# Patient Record
Sex: FEMALE | NOT HISPANIC OR LATINO | URBAN - NONMETROPOLITAN AREA
[De-identification: names, ages, dates, MRNs, and addresses within clinical notes are randomized per-mention and may not be internally consistent; named-entity substitution may affect disease eponyms.]

---

## 2017-01-17 ENCOUNTER — APPOINTMENT (RX ONLY)
Dept: URBAN - NONMETROPOLITAN AREA CLINIC 22 | Facility: CLINIC | Age: 62
Setting detail: DERMATOLOGY
End: 2017-01-17

## 2017-01-17 DIAGNOSIS — Z41.9 ENCOUNTER FOR PROCEDURE FOR PURPOSES OTHER THAN REMEDYING HEALTH STATE, UNSPECIFIED: ICD-10-CM

## 2017-01-17 PROBLEM — L40.0 PSORIASIS VULGARIS: Status: ACTIVE | Noted: 2017-01-17

## 2017-01-17 PROBLEM — E78.5 HYPERLIPIDEMIA, UNSPECIFIED: Status: ACTIVE | Noted: 2017-01-17

## 2017-01-17 PROCEDURE — ? OTHER (COSMETIC)

## 2017-01-17 ASSESSMENT — LOCATION SIMPLE DESCRIPTION DERM: LOCATION SIMPLE: LEFT FOREHEAD

## 2017-01-17 ASSESSMENT — LOCATION DETAILED DESCRIPTION DERM: LOCATION DETAILED: LEFT MEDIAL FOREHEAD

## 2017-01-17 ASSESSMENT — LOCATION ZONE DERM: LOCATION ZONE: FACE

## 2017-01-17 NOTE — PROCEDURE: OTHER (COSMETIC)
Other (Free Text): She was scheduled for her 4th microneedling treatment today. On her way up here, she received a phone call about her Mother-in-law and [place]. I expressed to her the importance of being with her  and family. We will reschedule when it is convenient for her.
Detail Level: Zone

## 2017-02-16 ENCOUNTER — APPOINTMENT (RX ONLY)
Dept: URBAN - NONMETROPOLITAN AREA CLINIC 22 | Facility: CLINIC | Age: 62
Setting detail: DERMATOLOGY
End: 2017-02-16

## 2017-02-16 ENCOUNTER — RX ONLY (OUTPATIENT)
Age: 62
Setting detail: RX ONLY
End: 2017-02-16

## 2017-02-16 DIAGNOSIS — Z41.9 ENCOUNTER FOR PROCEDURE FOR PURPOSES OTHER THAN REMEDYING HEALTH STATE, UNSPECIFIED: ICD-10-CM

## 2017-02-16 PROBLEM — L55.1 SUNBURN OF SECOND DEGREE: Status: ACTIVE | Noted: 2017-02-16

## 2017-02-16 PROBLEM — L29.8 OTHER PRURITUS: Status: ACTIVE | Noted: 2017-02-16

## 2017-02-16 PROBLEM — L85.3 XEROSIS CUTIS: Status: ACTIVE | Noted: 2017-02-16

## 2017-02-16 PROCEDURE — ? ECLIPSE MICROPEN

## 2017-02-16 PROCEDURE — ? OTHER (COSMETIC)

## 2017-02-16 RX ORDER — TRIAMCINOLONE ACETONIDE 0.1 %
OINTMENT (GRAM) TOPICAL
Qty: 1 | Refills: 0 | Status: ERX | COMMUNITY
Start: 2017-02-16

## 2017-02-16 ASSESSMENT — LOCATION DETAILED DESCRIPTION DERM
LOCATION DETAILED: RIGHT SUPERIOR CENTRAL MALAR CHEEK
LOCATION DETAILED: RIGHT CHIN
LOCATION DETAILED: INFERIOR MID FOREHEAD
LOCATION DETAILED: LEFT SUPERIOR CENTRAL MALAR CHEEK
LOCATION DETAILED: RIGHT MEDIAL MALAR CHEEK
LOCATION DETAILED: PHILTRUM
LOCATION DETAILED: LEFT INFERIOR NASAL CHEEK
LOCATION DETAILED: RIGHT MEDIAL FOREHEAD
LOCATION DETAILED: RIGHT SUPERIOR MEDIAL MALAR CHEEK
LOCATION DETAILED: LEFT INFERIOR CENTRAL MALAR CHEEK
LOCATION DETAILED: RIGHT INFERIOR CENTRAL MALAR CHEEK
LOCATION DETAILED: LEFT LATERAL CANTHUS

## 2017-02-16 ASSESSMENT — LOCATION SIMPLE DESCRIPTION DERM
LOCATION SIMPLE: LEFT CHEEK
LOCATION SIMPLE: UPPER LIP
LOCATION SIMPLE: RIGHT FOREHEAD
LOCATION SIMPLE: LEFT EYELID
LOCATION SIMPLE: INFERIOR FOREHEAD
LOCATION SIMPLE: RIGHT CHEEK
LOCATION SIMPLE: CHIN

## 2017-02-16 ASSESSMENT — LOCATION ZONE DERM
LOCATION ZONE: LIP
LOCATION ZONE: FACE
LOCATION ZONE: EYELID

## 2017-02-16 NOTE — PROCEDURE: OTHER (COSMETIC)
Other (Free Text): Today was treatment #4 of package. I will evaluate patient in a month. She knows she may need more treatments and she understands that it's going to take a few months for the true results to come out.
Detail Level: Zone

## 2017-02-16 NOTE — PROCEDURE: ECLIPSE MICROPEN
Prp Used In Ccs: 0
Depth In Mm: 1.7
Location #1: Cheeks
Depth In Mm: 0.6
Treatment Number (Optional): 4
Location #6: Lower Orbital Rim
Location #3: Upper Lip
Post-Care Instructions: After the procedure, take precautions against sun exposure. Do not apply sunscreen for 12 hours after the procedure. Do not apply make-up for 12 hours after the procedure. Avoid alcohol based toners for 10-14 days. After 2-3 days patients can return to their regular skin regimen.\\n\\nShe was sent home with gauze to be used as cool compresses if needed. SkinCeuticals CE Ferulic was applied post tx and sent home for daily use in the AM.
Depth In Mm: 0.7
Depth In Mm: 1.5
Location #2: Chin
Depth In Mm: 0.5
Location #4: Nose
Consent: Risks reviewed including but not limited to pain, scarring, infection and incomplete improvement.  Patient understands the procedure is cosmetic in nature and will require out of pocket payment.
Location #5: Crows Feet
Location #7: Forehead
Pre-Procedure Text: The treatment areas were cleansed with simply clean cleanser and topical numbing agent was then applied, left on for approximately 10 minutes. Numb was removed with simply clean and rough gauze. A second cleanse with simply clean cleanser and a warm towel, skin was prepped with alcohol. Treatment proceeded as outlined above.
Detail Level: Zone

## 2017-08-15 ENCOUNTER — APPOINTMENT (RX ONLY)
Dept: URBAN - NONMETROPOLITAN AREA CLINIC 22 | Facility: CLINIC | Age: 62
Setting detail: DERMATOLOGY
End: 2017-08-15

## 2017-08-15 VITALS — WEIGHT: 160 LBS | HEIGHT: 66.5 IN

## 2017-08-15 DIAGNOSIS — L57.8 OTHER SKIN CHANGES DUE TO CHRONIC EXPOSURE TO NONIONIZING RADIATION: ICD-10-CM

## 2017-08-15 DIAGNOSIS — M71 OTHER BURSOPATHIES: ICD-10-CM

## 2017-08-15 DIAGNOSIS — Z85.820 PERSONAL HISTORY OF MALIGNANT MELANOMA OF SKIN: ICD-10-CM

## 2017-08-15 DIAGNOSIS — L20.89 OTHER ATOPIC DERMATITIS: ICD-10-CM

## 2017-08-15 DIAGNOSIS — L71.8 OTHER ROSACEA: ICD-10-CM

## 2017-08-15 DIAGNOSIS — B07.8 OTHER VIRAL WARTS: ICD-10-CM

## 2017-08-15 DIAGNOSIS — D22 MELANOCYTIC NEVI: ICD-10-CM

## 2017-08-15 PROBLEM — E78.5 HYPERLIPIDEMIA, UNSPECIFIED: Status: ACTIVE | Noted: 2017-08-15

## 2017-08-15 PROBLEM — M71.372 OTHER BURSAL CYST, LEFT ANKLE AND FOOT: Status: ACTIVE | Noted: 2017-08-15

## 2017-08-15 PROBLEM — M12.9 ARTHROPATHY, UNSPECIFIED: Status: ACTIVE | Noted: 2017-08-15

## 2017-08-15 PROBLEM — L20.84 INTRINSIC (ALLERGIC) ECZEMA: Status: ACTIVE | Noted: 2017-08-15

## 2017-08-15 PROBLEM — L85.3 XEROSIS CUTIS: Status: ACTIVE | Noted: 2017-08-15

## 2017-08-15 PROBLEM — L40.0 PSORIASIS VULGARIS: Status: ACTIVE | Noted: 2017-08-15

## 2017-08-15 PROBLEM — D22.5 MELANOCYTIC NEVI OF TRUNK: Status: ACTIVE | Noted: 2017-08-15

## 2017-08-15 PROBLEM — L29.8 OTHER PRURITUS: Status: ACTIVE | Noted: 2017-08-15

## 2017-08-15 PROCEDURE — 99214 OFFICE O/P EST MOD 30 MIN: CPT

## 2017-08-15 PROCEDURE — ? PATIENT SPECIFIC COUNSELING

## 2017-08-15 PROCEDURE — ? COUNSELING

## 2017-08-15 PROCEDURE — ? TREATMENT REGIMEN

## 2017-08-15 ASSESSMENT — LOCATION DETAILED DESCRIPTION DERM
LOCATION DETAILED: RIGHT ANTERIOR PROXIMAL THIGH
LOCATION DETAILED: LEFT DORSAL 3RD TOE
LOCATION DETAILED: RIGHT INFERIOR MEDIAL UPPER BACK
LOCATION DETAILED: LEFT ANTECUBITAL SKIN
LOCATION DETAILED: LEFT INFERIOR CENTRAL MALAR CHEEK
LOCATION DETAILED: RIGHT INFERIOR MEDIAL MIDBACK
LOCATION DETAILED: RIGHT PROXIMAL PRETIBIAL REGION
LOCATION DETAILED: LEFT LATERAL PROXIMAL PRETIBIAL REGION
LOCATION DETAILED: LEFT ANTERIOR DISTAL THIGH

## 2017-08-15 ASSESSMENT — LOCATION ZONE DERM
LOCATION ZONE: TRUNK
LOCATION ZONE: TOE
LOCATION ZONE: LEG
LOCATION ZONE: FACE
LOCATION ZONE: ARM

## 2017-08-15 ASSESSMENT — LOCATION SIMPLE DESCRIPTION DERM
LOCATION SIMPLE: LEFT 3RD TOE
LOCATION SIMPLE: LEFT CHEEK
LOCATION SIMPLE: RIGHT LOWER BACK
LOCATION SIMPLE: RIGHT UPPER BACK
LOCATION SIMPLE: LEFT PRETIBIAL REGION
LOCATION SIMPLE: RIGHT THIGH
LOCATION SIMPLE: RIGHT PRETIBIAL REGION
LOCATION SIMPLE: LEFT THIGH
LOCATION SIMPLE: LEFT UPPER ARM

## 2017-08-15 NOTE — PROCEDURE: COUNSELING
Detail Level: Generalized
Detail Level: Detailed
Quality 224: Stage 0-Iic Melanoma: Overutilization Of Imaging Studies For Only Stage 0-Iic Melanoma: None of the following diagnostic imaging studies ordered: chest X-ray, CT, Ultrasound, MRI, PET, or nuclear medicine scans (ML)
Quality 137: Melanoma: Continuity Of Care - Recall System: Patient information entered into a recall system that includes: target date for the next exam specified AND a process to follow up with patients regarding missed or unscheduled appointments
Detail Level: Zone

## 2017-08-15 NOTE — HPI: OTHER
Condition:: F/u history of Rosacea located on her face
Please Describe Your Condition:: She currently applied Finacea QD, and Metrogel when She has outbreaks. She takes a Doxy 50mg daily. \\n\\nPatient states she is currently on Doxy 100mg BID for 4 days now, and will continue for a full 7 days for a severe sinus infection.

## 2017-08-15 NOTE — PROCEDURE: TREATMENT REGIMEN
Continue Regimen: Avene retinol QHS, AOX skin ceuticals serum
Detail Level: Simple
Detail Level: Zone
Continue Regimen: Finacea daily, Doxycycline 50mg daily, and Metrogel PRN for pimply outbreaks
Initiate Treatment: Can apply TMC ointment BID for up 2 weeks at a time, then PRN for flaring
Plan: She has had some def improvement in acne scarring from FX and microneedling, but not sure it is enough that she wants to consider more treatment .

## 2017-08-15 NOTE — PROCEDURE: PATIENT SPECIFIC COUNSELING
She has mult very small erythematous papules that do not appear to be follicularly based.   I am not sure if these are verucca or molluscum or an inflammatory process.  There is no pustule on ELM.   As they seem to be getting some better will follow for now.  Call if do not continue to improve.
Detail Level: Detailed

## 2017-08-15 NOTE — HPI: MELANOMA F/U (HISTORY OF MALIGNANT MELANOMA)
What Is The Reason For Today's Visit?: History of Melanoma
Year Excised?: 2007
Breslow Depth?: 0.34
Additional History: She is very compliant with her sun protection. She presents for a FBSE, melanoma check, and skin cancer surveillance

## 2017-11-07 ENCOUNTER — RX ONLY (OUTPATIENT)
Age: 62
Setting detail: RX ONLY
End: 2017-11-07

## 2017-11-07 RX ORDER — METRONIDAZOLE 10 MG/G
GEL TOPICAL DAILY
Qty: 1 | Refills: 3 | Status: ERX

## 2017-11-07 RX ORDER — AZELAIC ACID 0.15 G/G
AEROSOL, FOAM TOPICAL
Qty: 50 | Refills: 6 | Status: ERX

## 2018-03-19 ENCOUNTER — APPOINTMENT (RX ONLY)
Dept: URBAN - NONMETROPOLITAN AREA CLINIC 22 | Facility: CLINIC | Age: 63
Setting detail: DERMATOLOGY
End: 2018-03-19

## 2018-03-19 PROCEDURE — ? PRODUCT LINE (SUNSCREENS)

## 2018-03-19 PROCEDURE — ? PRODUCT LINE (ANTI-AGING)

## 2018-03-19 NOTE — PROCEDURE: PRODUCT LINE (ANTI-AGING)
Product 76 Units: 0
Product 74 Price (In Dollars - Numeric Only, No Special Characters Or $): 0.00
Detail Level: Zone
Name Of Product 1: Avene retrinal 0.1%
Product 1 Units: 1
Send Charges To Patient Encounter: Yes
Name Of Product 2: Avene Retinaldehyde 0.1% Cream
Product 2 Application Directions: apply qhs
Product 1 Application Directions: apply  AM
Product 1 Price (In Dollars - Numeric Only, No Special Characters Or $): 73.14

## 2018-03-19 NOTE — PROCEDURE: PRODUCT LINE (SUNSCREENS)
Product 61 Units: 0
Product 28 Price (In Dollars - Numeric Only, No Special Characters Or $): 0.00
Product 1 Units: 1
Product 1 Price (In Dollars - Numeric Only, No Special Characters Or $): 36.04
Detail Level: Zone
Send Charges To Patient Encounter: Yes
Name Of Product 1: Skin ceuticels physical fusion

## 2018-08-22 ENCOUNTER — APPOINTMENT (RX ONLY)
Dept: URBAN - NONMETROPOLITAN AREA CLINIC 22 | Facility: CLINIC | Age: 63
Setting detail: DERMATOLOGY
End: 2018-08-22

## 2018-08-22 VITALS — HEIGHT: 66 IN | WEIGHT: 170 LBS

## 2018-08-22 DIAGNOSIS — Z87.2 PERSONAL HISTORY OF DISEASES OF THE SKIN AND SUBCUTANEOUS TISSUE: ICD-10-CM

## 2018-08-22 DIAGNOSIS — D22 MELANOCYTIC NEVI: ICD-10-CM

## 2018-08-22 DIAGNOSIS — L71.8 OTHER ROSACEA: ICD-10-CM | Status: STABLE

## 2018-08-22 DIAGNOSIS — L57.0 ACTINIC KERATOSIS: ICD-10-CM

## 2018-08-22 DIAGNOSIS — K13.0 DISEASES OF LIPS: ICD-10-CM

## 2018-08-22 DIAGNOSIS — L57.8 OTHER SKIN CHANGES DUE TO CHRONIC EXPOSURE TO NONIONIZING RADIATION: ICD-10-CM

## 2018-08-22 DIAGNOSIS — L73.8 OTHER SPECIFIED FOLLICULAR DISORDERS: ICD-10-CM

## 2018-08-22 DIAGNOSIS — M71 OTHER BURSOPATHIES: ICD-10-CM

## 2018-08-22 DIAGNOSIS — Z85.820 PERSONAL HISTORY OF MALIGNANT MELANOMA OF SKIN: ICD-10-CM

## 2018-08-22 PROBLEM — M12.9 ARTHROPATHY, UNSPECIFIED: Status: ACTIVE | Noted: 2018-08-22

## 2018-08-22 PROBLEM — L70.0 ACNE VULGARIS: Status: ACTIVE | Noted: 2018-08-22

## 2018-08-22 PROBLEM — L20.84 INTRINSIC (ALLERGIC) ECZEMA: Status: ACTIVE | Noted: 2018-08-22

## 2018-08-22 PROBLEM — L40.0 PSORIASIS VULGARIS: Status: ACTIVE | Noted: 2018-08-22

## 2018-08-22 PROBLEM — L85.3 XEROSIS CUTIS: Status: ACTIVE | Noted: 2018-08-22

## 2018-08-22 PROBLEM — J30.1 ALLERGIC RHINITIS DUE TO POLLEN: Status: ACTIVE | Noted: 2018-08-22

## 2018-08-22 PROBLEM — E78.5 HYPERLIPIDEMIA, UNSPECIFIED: Status: ACTIVE | Noted: 2018-08-22

## 2018-08-22 PROBLEM — L29.8 OTHER PRURITUS: Status: ACTIVE | Noted: 2018-08-22

## 2018-08-22 PROBLEM — M71.372 OTHER BURSAL CYST, LEFT ANKLE AND FOOT: Status: ACTIVE | Noted: 2018-08-22

## 2018-08-22 PROBLEM — L55.1 SUNBURN OF SECOND DEGREE: Status: ACTIVE | Noted: 2018-08-22

## 2018-08-22 PROBLEM — D22.5 MELANOCYTIC NEVI OF TRUNK: Status: ACTIVE | Noted: 2018-08-22

## 2018-08-22 PROCEDURE — ? PATIENT SPECIFIC COUNSELING

## 2018-08-22 PROCEDURE — ? PRESCRIPTION

## 2018-08-22 PROCEDURE — ? TREATMENT REGIMEN

## 2018-08-22 PROCEDURE — ? COUNSELING

## 2018-08-22 PROCEDURE — 99214 OFFICE O/P EST MOD 30 MIN: CPT

## 2018-08-22 RX ORDER — FLUOROURACIL 50 MG/G
CREAM TOPICAL
Qty: 1 | Refills: 1 | Status: ERX | COMMUNITY
Start: 2018-08-22

## 2018-08-22 RX ORDER — HYDROCORTISONE 2.5 %
OINTMENT (GRAM) TOPICAL
Qty: 1 | Refills: 1 | Status: ERX | COMMUNITY
Start: 2018-08-22

## 2018-08-22 RX ADMIN — FLUOROURACIL: 50 CREAM TOPICAL at 00:00

## 2018-08-22 RX ADMIN — Medication: at 00:00

## 2018-08-22 ASSESSMENT — LOCATION DETAILED DESCRIPTION DERM
LOCATION DETAILED: LEFT CENTRAL MALAR CHEEK
LOCATION DETAILED: LEFT DORSAL 3RD TOE
LOCATION DETAILED: RIGHT DISTAL POSTERIOR THIGH
LOCATION DETAILED: LEFT SUPERIOR CENTRAL BUCCAL CHEEK
LOCATION DETAILED: RIGHT MEDIAL MALAR CHEEK
LOCATION DETAILED: RIGHT INFERIOR MEDIAL MIDBACK
LOCATION DETAILED: RIGHT SUPERIOR VERMILION LIP
LOCATION DETAILED: LEFT INFERIOR VERMILION LIP

## 2018-08-22 ASSESSMENT — LOCATION SIMPLE DESCRIPTION DERM
LOCATION SIMPLE: LEFT 3RD TOE
LOCATION SIMPLE: RIGHT CHEEK
LOCATION SIMPLE: RIGHT LIP
LOCATION SIMPLE: LEFT LIP
LOCATION SIMPLE: LEFT CHEEK
LOCATION SIMPLE: RIGHT POSTERIOR THIGH
LOCATION SIMPLE: RIGHT LOWER BACK

## 2018-08-22 ASSESSMENT — LOCATION ZONE DERM
LOCATION ZONE: TRUNK
LOCATION ZONE: FACE
LOCATION ZONE: LEG
LOCATION ZONE: LIP
LOCATION ZONE: TOE

## 2018-08-22 NOTE — PROCEDURE: TREATMENT REGIMEN
Continue Regimen: Doxycycline 50 mg q.d., Finacea and Metronidazole
Detail Level: Simple
Plan: Not bothersome to pt
Plan: Pt educated on treatment option and will use efudex cream b.i.d x 2 weeks. Pt given handout and verbal instructions.

## 2018-08-22 NOTE — HPI: PHOTOAGING (ACTINIC DAMAGE)
Is This A New Presentation, Or A Follow-Up?: Sun Damage
Additional History: Pt is here for CSE and Skin cancer surveillance

## 2018-08-22 NOTE — PROCEDURE: PATIENT SPECIFIC COUNSELING
Detail Level: Simple
Will use HC 2.5% ointment no longer that 2 week then will use Vaseline, will d/c all other products to lips.  I suspect this is a contact to one of the agents she has been using.

## 2018-12-21 ENCOUNTER — APPOINTMENT (RX ONLY)
Dept: URBAN - NONMETROPOLITAN AREA CLINIC 22 | Facility: CLINIC | Age: 63
Setting detail: DERMATOLOGY
End: 2018-12-21

## 2018-12-21 PROCEDURE — ? PRODUCT LINE (ANTI-AGING)

## 2018-12-21 NOTE — PROCEDURE: PRODUCT LINE (ANTI-AGING)
Product 61 Units: 0
Product 74 Price (In Dollars - Numeric Only, No Special Characters Or $): 0.00
Product 10 Price (In Dollars - Numeric Only, No Special Characters Or $): 189.90
Name Of Product 71: Skin[employer]ica Illuminating Eye Serum
Product 14 Price (In Dollars - Numeric Only, No Special Characters Or $): 32.55
Name Of Product 38: R|Essentials Brightening Pads
Product 64 Price (In Dollars - Numeric Only, No Special Characters Or $): 145.16
Product 4 Price (In Dollars - Numeric Only, No Special Characters Or $): 40.64
Name Of Product 6: Skin[employer]ica TNS Recovery Complex
Name Of Product 52: Avene Retrinal 0.1%
Product 63 Price (In Dollars - Numeric Only, No Special Characters Or $): 86.22
Name Of Product 25: R|Essentials Retinol Serum 10x
Name Of Product 10: Phloretin Gel
Product 17 Price (In Dollars - Numeric Only, No Special Characters Or $): 71.30
Product 15 Price (In Dollars - Numeric Only, No Special Characters Or $): 97.28
Name Of Product 12: Repair Eye Serum
Name Of Product 1: R|E Advanced Eye Cream
Product 31 Application Directions: Pt recieved 15% off for December Special. Pt will use Q HS to arms and Legs and Use Cetaphil Restoraderm Cream or CeraVe cream to arms and legs q am.
Product 25 Application Directions: Product is 10% off this month
Name Of Product 30: SkinCeuticals Metacell Renewal B3
Name Of Product 11: Triple Lipid Restore
Name Of Product 9: R|Essentials Triple Antioxidant Serum
Product 52 Units: 1
Name Of Product 19: AHA|BHA Exfoliating Cleanser
Product 29 Application Directions: Pt Received 15% off for December Special
Product 9 Price (In Dollars - Numeric Only, No Special Characters Or $): 88.20
Name Of Product 8: Skinceuticals Retinol 0.05%
Product 33 Application Directions: In Office Dispensing Rx
Name Of Product 62: SkinCeuticals Retexturizing Activator
Product 35 Price (In Dollars - Numeric Only, No Special Characters Or $): 81.43
Product 34 Price (In Dollars - Numeric Only, No Special Characters Or $): 80.59
Product 29 Price (In Dollars - Numeric Only, No Special Characters Or $): 74.97
Name Of Product 24: TNS Recovery Complex
Product 23 Price (In Dollars - Numeric Only, No Special Characters Or $): 94.82
Product 21 Price (In Dollars - Numeric Only, No Special Characters Or $): 24.57
Product 19 Price (In Dollars - Numeric Only, No Special Characters Or $): 54.05
Name Of Product 63: SkinCeuticals HA Intensifier
Product 27 Price (In Dollars - Numeric Only, No Special Characters Or $): 60.48
Product 13 Price (In Dollars - Numeric Only, No Special Characters Or $): 71.40
Name Of Product 27: R|E Brightening Pads
Name Of Product 34: AGE Eye Complex
Product 52 Price (In Dollars - Numeric Only, No Special Characters Or $): 61.58
Name Of Product 18: R|E Antioxidant Infused Gentle Foamy CLeanser
Product 73 Price (In Dollars - Numeric Only, No Special Characters Or $): 157.48
Product 62 Application Directions: Holiday Special
Name Of Product 4: R|E Tinted Physical Sunscreen SPF 50
Product 16 Price (In Dollars - Numeric Only, No Special Characters Or $): 68.43
Name Of Product 17: Avene RetrinAL 0.05%
Product 3 Price (In Dollars - Numeric Only, No Special Characters Or $): 69.62
Product 61 Price (In Dollars - Numeric Only, No Special Characters Or $): 137.09
Product 22 Price (In Dollars - Numeric Only, No Special Characters Or $): 28.83
Name Of Product 33: R|Essentials Anti-Aging Kit with 4% Hydroquinone
Name Of Product 29: R|E Firming Neck Cream
Name Of Product 15: R|Essentials Byronrpavan C Serum
Product 21 Application Directions: Pt received 10% off  for October Special
Product 28 Price (In Dollars - Numeric Only, No Special Characters Or $): 29.30
Name Of Product 31: R|E Retinol Body Lotion
Render Product Pricing In Note: Yes
Name Of Product 37: R/Essentials Lash Boost
Product 30 Price (In Dollars - Numeric Only, No Special Characters Or $): 107.53
Product 18 Application Directions: Pt received 10% off for October Special
Product 34 Application Directions: Apply to upper & lower lids Q HS-BID-Pt received 15% off for January special
Product 32 Price (In Dollars - Numeric Only, No Special Characters Or $): 47.90
Product 37 Application Directions: Pt received free Lip plumper with purchase for extension of December special.
Name Of Product 7: CE Ferulic
Name Of Product 3: R|E Retinol Serum 5X
Product 27 Application Directions: Pt received 10% off for October Special. Hydroquinone 4% added additionally. See In House Dispensing Pharmacy Rx.
Name Of Product 32: Avene RetrinAL EYES
Product 38 Application Directions: Will mix in 4% Hydrquinone. Pt will use 1x daily q HS and supercharged C Serum Q am
Name Of Product 14: Antioxident Infused Foamy Cleanser
Name Of Product 2: R|E Triple Antioxidant Cream
Product 6 Price (In Dollars - Numeric Only, No Special Characters Or $): 205.85
Product 51 Price (In Dollars - Numeric Only, No Special Characters Or $): 12.32
Product 12 Price (In Dollars - Numeric Only, No Special Characters Or $): 82.80
Product 23 Application Directions: Apply Q HS to upper and lower lids. Pt concerned about dark circles and increasing puffiness but does not want retinol around eyes.
Name Of Product 26: SkinMedica TD&R
Product 30 Application Directions: Apply QD-BID to parched skin. Pt skin becomes very dry in the winter and if she feels she needs more hydration will purchase R|E Hydrating Serum to place under metacell renewal. Pt does have sun damage to face and this product will assist in rejuvinating skin tone and texture.
Product 38 Price (In Dollars - Numeric Only, No Special Characters Or $): 57.12
Product 18 Price (In Dollars - Numeric Only, No Special Characters Or $): 34.27
Name Of Product 23: AGE Eye Serum
Product 11 Price (In Dollars - Numeric Only, No Special Characters Or $): 123.30
Name Of Product 64: SkinCeuticals CE Ferulic
Product 24 Application Directions: Pt does not want to spend more money on TNS Essential Serum and wanted to discuss other options. As she still wants a growth factor, recommended she purchase TNS Recovery Complex to use (as it is cheaper) and purchase CE Ferulic for an antioxident. Pt is agreeable and would like to trial CE Ferulic First. Will mail TNS Recovery Complex to pt (charged $5.50 for shipping) and will send along CE Ferulic 5mL sample for patient to try. Also sent Triple Lipid Restore sample. Pt will contact office once she trials samples for further discussion.
Product 15 Application Directions: Pt received 15% off for December Special.
Detail Level: Zone
Product 25 Price (In Dollars - Numeric Only, No Special Characters Or $): 84.11
Product 72 Price (In Dollars - Numeric Only, No Special Characters Or $): 58.65
Name Of Product 13: R|E Ultra Hydrating Serum
Product 62 Price (In Dollars - Numeric Only, No Special Characters Or $): 66.47
Product 20 Price (In Dollars - Numeric Only, No Special Characters Or $): 46.00
Product 65 Price (In Dollars - Numeric Only, No Special Characters Or $): 96.78
Name Of Product 22: R|E Foaming AHA 10% Cleanser
Product 71 Price (In Dollars - Numeric Only, No Special Characters Or $): 95.22
Product 26 Price (In Dollars - Numeric Only, No Special Characters Or $): 66.41
Name Of Product 21: R|E Lite Moisturizing Cream
Product 36 Application Directions: Pt received 15% off for January special.
Product 33 Price (In Dollars - Numeric Only, No Special Characters Or $): 187.00
Name Of Product 5: R|E Advanced Corrective Cream
Product 1 Price (In Dollars - Numeric Only, No Special Characters Or $): 70.38
Name Of Product 28: R|E AHA 10% Cleanser-Non Foaming
Product 61 Application Directions: Holiday Speelio
Product 24 Price (In Dollars - Numeric Only, No Special Characters Or $): 174.97
Product 7 Application Directions: Apply BID, Pt received $15% off for January Special
Product 37 Price (In Dollars - Numeric Only, No Special Characters Or $): 98.00
Product 28 Application Directions: Pt recieved 10% off for October Special
Product 8 Price (In Dollars - Numeric Only, No Special Characters Or $): 73.60
Name Of Product 51: Avene Cold Cream Lip Balm
Name Of Product 72: SkinMedica TNS Lip Plump System
Name Of Product 20: LHA Cleanser
Name Of Product 61: SkinCeuticals Phloretin Liquid
Product 35 Application Directions: Apply BID to face-Pt received 15% off for January Special

## 2019-02-27 ENCOUNTER — APPOINTMENT (RX ONLY)
Dept: URBAN - NONMETROPOLITAN AREA CLINIC 22 | Facility: CLINIC | Age: 64
Setting detail: DERMATOLOGY
End: 2019-02-27

## 2019-02-27 VITALS — HEIGHT: 66 IN | WEIGHT: 160 LBS

## 2019-02-27 DIAGNOSIS — L44.8 OTHER SPECIFIED PAPULOSQUAMOUS DISORDERS: ICD-10-CM

## 2019-02-27 DIAGNOSIS — L82.1 OTHER SEBORRHEIC KERATOSIS: ICD-10-CM

## 2019-02-27 DIAGNOSIS — L57.0 ACTINIC KERATOSIS: ICD-10-CM

## 2019-02-27 PROBLEM — L29.8 OTHER PRURITUS: Status: ACTIVE | Noted: 2019-02-27

## 2019-02-27 PROBLEM — J30.1 ALLERGIC RHINITIS DUE TO POLLEN: Status: ACTIVE | Noted: 2019-02-27

## 2019-02-27 PROBLEM — E78.5 HYPERLIPIDEMIA, UNSPECIFIED: Status: ACTIVE | Noted: 2019-02-27

## 2019-02-27 PROBLEM — L70.0 ACNE VULGARIS: Status: ACTIVE | Noted: 2019-02-27

## 2019-02-27 PROBLEM — L40.0 PSORIASIS VULGARIS: Status: ACTIVE | Noted: 2019-02-27

## 2019-02-27 PROBLEM — Z85.820 PERSONAL HISTORY OF MALIGNANT MELANOMA OF SKIN: Status: ACTIVE | Noted: 2019-02-27

## 2019-02-27 PROBLEM — L85.3 XEROSIS CUTIS: Status: ACTIVE | Noted: 2019-02-27

## 2019-02-27 PROBLEM — M12.9 ARTHROPATHY, UNSPECIFIED: Status: ACTIVE | Noted: 2019-02-27

## 2019-02-27 PROBLEM — L55.1 SUNBURN OF SECOND DEGREE: Status: ACTIVE | Noted: 2019-02-27

## 2019-02-27 PROBLEM — L20.84 INTRINSIC (ALLERGIC) ECZEMA: Status: ACTIVE | Noted: 2019-02-27

## 2019-02-27 PROCEDURE — ? COUNSELING

## 2019-02-27 PROCEDURE — 99213 OFFICE O/P EST LOW 20 MIN: CPT

## 2019-02-27 PROCEDURE — ? TREATMENT REGIMEN

## 2019-02-27 ASSESSMENT — LOCATION DETAILED DESCRIPTION DERM
LOCATION DETAILED: RIGHT DISTAL RADIAL DORSAL FOREARM
LOCATION DETAILED: LEFT CLAVICULAR SKIN
LOCATION DETAILED: NASAL DORSUM

## 2019-02-27 ASSESSMENT — LOCATION ZONE DERM
LOCATION ZONE: NOSE
LOCATION ZONE: TRUNK
LOCATION ZONE: ARM

## 2019-02-27 ASSESSMENT — LOCATION SIMPLE DESCRIPTION DERM
LOCATION SIMPLE: LEFT CLAVICULAR SKIN
LOCATION SIMPLE: NOSE
LOCATION SIMPLE: RIGHT FOREARM

## 2019-02-27 NOTE — HPI: SKIN LESION
What Type Of Note Output Would You Prefer (Optional)?: Standard Output
Has Your Skin Lesion Been Treated?: not been treated
Is This A New Presentation, Or A Follow-Up?: Skin Lesion
Which Family Member (Optional)?: Sister

## 2019-02-27 NOTE — PROCEDURE: TREATMENT REGIMEN
Plan: this is a very subtle area of hyperkeratosis and nonarborizing telangectasia.  Not sure if this is an early SK or AK.  Pt educated on treatment options and and will use 5-fu to area. If no reaction in 4-5 days will d/c .  Handout given.
Detail Level: Zone
Plan: Small persistant area of erythema and mild scaling with some yellowish discoloration.  Pt will use OTC HC to lesion and if not resolved in a few weeks, will use 5-fu bid for 2 weeks.

## 2019-07-25 ENCOUNTER — RX ONLY (OUTPATIENT)
Age: 64
Setting detail: RX ONLY
End: 2019-07-25

## 2019-07-25 RX ORDER — TRIAMCINOLONE ACETONIDE 1 MG/G
OINTMENT TOPICAL
Qty: 1 | Refills: 0 | Status: ERX | COMMUNITY
Start: 2019-07-25

## 2019-09-25 ENCOUNTER — APPOINTMENT (RX ONLY)
Dept: URBAN - NONMETROPOLITAN AREA CLINIC 22 | Facility: CLINIC | Age: 64
Setting detail: DERMATOLOGY
End: 2019-09-25

## 2019-09-25 DIAGNOSIS — D22 MELANOCYTIC NEVI: ICD-10-CM

## 2019-09-25 DIAGNOSIS — Z80.8 FAMILY HISTORY OF MALIGNANT NEOPLASM OF OTHER ORGANS OR SYSTEMS: ICD-10-CM

## 2019-09-25 DIAGNOSIS — Z71.89 OTHER SPECIFIED COUNSELING: ICD-10-CM

## 2019-09-25 DIAGNOSIS — D485 NEOPLASM OF UNCERTAIN BEHAVIOR OF SKIN: ICD-10-CM

## 2019-09-25 DIAGNOSIS — L71.8 OTHER ROSACEA: ICD-10-CM | Status: WELL CONTROLLED

## 2019-09-25 DIAGNOSIS — Z85.820 PERSONAL HISTORY OF MALIGNANT MELANOMA OF SKIN: ICD-10-CM

## 2019-09-25 DIAGNOSIS — L20.89 OTHER ATOPIC DERMATITIS: ICD-10-CM | Status: WELL CONTROLLED

## 2019-09-25 DIAGNOSIS — D18.0 HEMANGIOMA: ICD-10-CM

## 2019-09-25 DIAGNOSIS — L57.8 OTHER SKIN CHANGES DUE TO CHRONIC EXPOSURE TO NONIONIZING RADIATION: ICD-10-CM

## 2019-09-25 DIAGNOSIS — L82.1 OTHER SEBORRHEIC KERATOSIS: ICD-10-CM

## 2019-09-25 PROBLEM — D18.01 HEMANGIOMA OF SKIN AND SUBCUTANEOUS TISSUE: Status: ACTIVE | Noted: 2019-09-25

## 2019-09-25 PROBLEM — L20.84 INTRINSIC (ALLERGIC) ECZEMA: Status: ACTIVE | Noted: 2019-09-25

## 2019-09-25 PROBLEM — D48.5 NEOPLASM OF UNCERTAIN BEHAVIOR OF SKIN: Status: ACTIVE | Noted: 2019-09-25

## 2019-09-25 PROBLEM — D22.9 MELANOCYTIC NEVI, UNSPECIFIED: Status: ACTIVE | Noted: 2019-09-25

## 2019-09-25 PROCEDURE — ? PRESCRIPTION

## 2019-09-25 PROCEDURE — ? TREATMENT REGIMEN

## 2019-09-25 PROCEDURE — 99214 OFFICE O/P EST MOD 30 MIN: CPT

## 2019-09-25 PROCEDURE — ? PHOTO-DOCUMENTATION

## 2019-09-25 PROCEDURE — ? FULL BODY SKIN EXAM

## 2019-09-25 PROCEDURE — ? COUNSELING

## 2019-09-25 RX ORDER — METRONIDAZOLE 7.5 MG/G
GEL TOPICAL QHS
Qty: 1 | Refills: 3 | Status: ERX | COMMUNITY
Start: 2019-09-25

## 2019-09-25 RX ORDER — TRIAMCINOLONE ACETONIDE 1 MG/G
OINTMENT TOPICAL BID
Qty: 1 | Refills: 2 | Status: ERX | COMMUNITY
Start: 2019-09-25

## 2019-09-25 RX ORDER — DOXYCYCLINE HYCLATE 50 MG/1
TABLET, FILM COATED ORAL
Qty: 30 | Refills: 1 | Status: ERX | COMMUNITY
Start: 2019-09-25

## 2019-09-25 RX ADMIN — METRONIDAZOLE: 7.5 GEL TOPICAL at 12:00

## 2019-09-25 RX ADMIN — TRIAMCINOLONE ACETONIDE: 1 OINTMENT TOPICAL at 11:44

## 2019-09-25 RX ADMIN — DOXYCYCLINE HYCLATE: 50 TABLET, FILM COATED ORAL at 11:59

## 2019-09-25 ASSESSMENT — LOCATION DETAILED DESCRIPTION DERM
LOCATION DETAILED: LEFT INFERIOR ANTERIOR NECK
LOCATION DETAILED: LEFT DISTAL PLANTAR 3RD TOE

## 2019-09-25 ASSESSMENT — LOCATION SIMPLE DESCRIPTION DERM
LOCATION SIMPLE: LEFT ANTERIOR NECK
LOCATION SIMPLE: LEFT 3RD TOE

## 2019-09-25 ASSESSMENT — LOCATION ZONE DERM
LOCATION ZONE: NECK
LOCATION ZONE: TOE

## 2019-09-25 NOTE — PROCEDURE: TREATMENT REGIMEN
Continue Regimen: Apply TMC ointment BID for up 2 weeks at a time, then PRN for flaring
Detail Level: Generalized
Continue Regimen: Finacea compound as prescribed\\nDoxycycline 100mg: Take one po qd\\nmetronidazole 0.75 gel
Detail Level: Simple
Plan: Will call pharmacy to refill Finacea.

## 2019-09-25 NOTE — PROCEDURE: FULL BODY SKIN EXAM
Price (Do Not Change): 0.00
Detail Level: Simple
Instructions: This plan will send the code FBSE to the PM system.  DO NOT or CHANGE the price.
Anxiety

## 2019-09-25 NOTE — PROCEDURE: PHOTO-DOCUMENTATION
Photo Preface (Leave Blank If You Do Not Want): Photographs were obtained today
Detail Level: Detailed
Details (Free Text): Patient has noticed lesion for about a month. We will watch for another month and if lesion doesn't resolve, pt will RTO for possible biopsy.

## 2019-09-25 NOTE — PROCEDURE: MIPS QUALITY
Quality 130: Documentation Of Current Medications In The Medical Record: Current Medications Documented
Quality 226: Preventive Care And Screening: Tobacco Use: Screening And Cessation Intervention: Patient screened for tobacco use and is an ex/non-smoker
Detail Level: Detailed
Quality 431: Preventive Care And Screening: Unhealthy Alcohol Use - Screening: Patient screened for unhealthy alcohol use using a single question and scores less than 2 times per year
Quality 137: Melanoma: Continuity Of Care - Recall System: Patient information entered into a recall system that includes: target date for the next exam specified AND a process to follow up with patients regarding missed or unscheduled appointments

## 2019-12-16 ENCOUNTER — RX ONLY (OUTPATIENT)
Age: 64
Setting detail: RX ONLY
End: 2019-12-16

## 2020-03-24 ENCOUNTER — RX ONLY (OUTPATIENT)
Age: 65
Setting detail: RX ONLY
End: 2020-03-24

## 2022-10-04 ENCOUNTER — APPOINTMENT (RX ONLY)
Dept: URBAN - NONMETROPOLITAN AREA CLINIC 22 | Facility: CLINIC | Age: 67
Setting detail: DERMATOLOGY
End: 2022-10-04

## 2022-10-04 PROBLEM — C44.311 BASAL CELL CARCINOMA OF SKIN OF NOSE: Status: ACTIVE | Noted: 2022-10-04

## 2022-10-04 PROCEDURE — ? MOHS SURGERY

## 2022-10-04 PROCEDURE — 17311 MOHS 1 STAGE H/N/HF/G: CPT

## 2022-10-04 PROCEDURE — ? COUNSELING

## 2022-10-04 NOTE — PROCEDURE: MOHS SURGERY
Dorothea Gross didn't talk to me about this pt or any pt today. Dorsal Nasal Flap Text: The defect edges were debeveled with a #15 scalpel blade.  Given the location of the defect and the proximity to free margins a dorsal nasal flap was deemed most appropriate.  Using a sterile surgical marker, an appropriate dorsal nasal flap was drawn around the defect.    The area thus outlined was incised deep to adipose tissue with a #15 scalpel blade.  The skin margins were undermined to an appropriate distance in all directions utilizing iris scissors.

## 2025-01-23 NOTE — PROCEDURE: MOHS SURGERY
PMD Posterior Auricular Interpolation Flap Text: A decision was made to reconstruct the defect utilizing an interpolation axial flap and a staged reconstruction.  A telfa template was made of the defect.  This telfa template was then used to outline the posterior auricular interpolation flap.  The donor area for the pedicle flap was then injected with anesthesia.  The flap was excised through the skin and subcutaneous tissue down to the layer of the underlying musculature.  The pedicle flap was carefully excised within this deep plane to maintain its blood supply.  The edges of the donor site were undermined.   The donor site was closed in a primary fashion.  The pedicle was then rotated into position and sutured.  Once the tube was sutured into place, adequate blood supply was confirmed with blanching and refill.  The pedicle was then wrapped with xeroform gauze and dressed appropriately with a telfa and gauze bandage to ensure continued blood supply and protect the attached pedicle.